# Patient Record
Sex: MALE | Race: WHITE | Employment: FULL TIME | URBAN - METROPOLITAN AREA
[De-identification: names, ages, dates, MRNs, and addresses within clinical notes are randomized per-mention and may not be internally consistent; named-entity substitution may affect disease eponyms.]

---

## 2017-09-19 ENCOUNTER — OFFICE VISIT (OUTPATIENT)
Dept: FAMILY MEDICINE CLINIC | Age: 42
End: 2017-09-19

## 2017-09-19 VITALS
RESPIRATION RATE: 16 BRPM | HEART RATE: 82 BPM | TEMPERATURE: 97 F | DIASTOLIC BLOOD PRESSURE: 70 MMHG | HEIGHT: 65 IN | WEIGHT: 260 LBS | BODY MASS INDEX: 43.32 KG/M2 | SYSTOLIC BLOOD PRESSURE: 124 MMHG | OXYGEN SATURATION: 98 %

## 2017-09-19 DIAGNOSIS — E78.6 LOW HDL (UNDER 40): ICD-10-CM

## 2017-09-19 DIAGNOSIS — R07.89 ATYPICAL CHEST PAIN: ICD-10-CM

## 2017-09-19 DIAGNOSIS — E66.01 MORBID OBESITY WITH BMI OF 40.0-44.9, ADULT (HCC): ICD-10-CM

## 2017-09-19 DIAGNOSIS — R06.83 SNORING: ICD-10-CM

## 2017-09-19 DIAGNOSIS — Z00.00 WELL ADULT EXAM: Primary | ICD-10-CM

## 2017-09-19 PROCEDURE — 93000 ELECTROCARDIOGRAM COMPLETE: CPT | Performed by: FAMILY MEDICINE

## 2017-09-19 PROCEDURE — 99396 PREV VISIT EST AGE 40-64: CPT | Performed by: FAMILY MEDICINE

## 2017-09-19 ASSESSMENT — ENCOUNTER SYMPTOMS
VOMITING: 0
EYE ITCHING: 0
TROUBLE SWALLOWING: 0
EYE REDNESS: 0
NAUSEA: 0
SHORTNESS OF BREATH: 0
ABDOMINAL PAIN: 0
WHEEZING: 0
CHEST TIGHTNESS: 0
RHINORRHEA: 0

## 2017-10-12 ENCOUNTER — TELEPHONE (OUTPATIENT)
Dept: SLEEP MEDICINE | Age: 42
End: 2017-10-12

## 2017-10-12 NOTE — TELEPHONE ENCOUNTER
Sandeep Ambrizer was referred to Sleep Medicine for Sleep Study. A message was left on the patient's voicemail to call the office back to schedule a consult.

## 2017-11-07 ENCOUNTER — OFFICE VISIT (OUTPATIENT)
Dept: ORTHOPEDIC SURGERY | Age: 42
End: 2017-11-07

## 2017-11-07 VITALS
DIASTOLIC BLOOD PRESSURE: 69 MMHG | SYSTOLIC BLOOD PRESSURE: 106 MMHG | WEIGHT: 272 LBS | RESPIRATION RATE: 16 BRPM | HEIGHT: 65 IN | BODY MASS INDEX: 45.32 KG/M2 | HEART RATE: 79 BPM

## 2017-11-07 DIAGNOSIS — M17.11 ARTHRITIS OF RIGHT KNEE: ICD-10-CM

## 2017-11-07 DIAGNOSIS — M25.561 ACUTE PAIN OF RIGHT KNEE: Primary | ICD-10-CM

## 2017-11-07 PROCEDURE — 99203 OFFICE O/P NEW LOW 30 MIN: CPT | Performed by: NURSE PRACTITIONER

## 2017-11-07 PROCEDURE — 73562 X-RAY EXAM OF KNEE 3: CPT | Performed by: NURSE PRACTITIONER

## 2017-11-07 PROCEDURE — 20610 DRAIN/INJ JOINT/BURSA W/O US: CPT | Performed by: NURSE PRACTITIONER

## 2017-11-07 NOTE — LETTER
[address] [facility] After Hours  [address]  [address] [facility],[address]  [address]  Phone: [phone]  Fax: [phone] Vish Maya CNP        November 10, 2017     Brook Barnhart, [address]    Patient: Zuly Loya  MR Number: [id number]  YOB: 1975  Date of Visit: 11/7/2017    Dear Dr. Brook Barnhart:    Thank you for the request for consultation for Zuly Loya to me for the evaluation of   Encounter Diagnoses   Name Primary?  Acute pain of right knee Yes    Arthritis of right knee      . Below are the relevant portions of my assessment and plan of care. [facility] Orthopedic Surgery  After Hours Clinic Note      CHIEF COMPLAINT:  Right medial knee pain    History Obtained From:  patient    HISTORY OF PRESENT ILLNESS:    The patient is a 38 y.o. male who presents with medial right knee pain that has been worsening over the past 3 years. Pain is described in right medial knee and with the intensity of moderate. Rates pain a 6-7/10 VAS and waxes and wanes. Pain is described as aching and intermittent. Pain is worse when driving and has no pain with rest or walking. When going to get up from driving, after walking a while the pain starts to subside. 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 He denies locking, clicking or instability. No numbness or tingling or weakness. He tried Aleve with no improvement. He only has one kidney and does not like to take many medication. He is going to walk in the [facility] Thanksgiving day parade and wants his knee pain gone prior to then.      Past Medical History:        Diagnosis Date    Cryptorchidism     R    Kidney agenesis     R     Obesity 8/22/2013    Seasonal allergies     Snoring        Past Surgical History:        Procedure Laterality Date    APPENDECTOMY      as child     HERNIA REPAIR 13 yo (femoral hernia?)     OTHER SURGICAL HISTORY      cryp repair        Social History   Substance Use Topics    Smoking status: Never Smoker    Smokeless tobacco: Never Used    Alcohol use No       Family History   Problem Relation Age of Onset    Cancer Maternal Grandmother      ovary    Other Maternal Aunt      renal failure    Other Maternal Grandfather      renal agenesis           Current Medications:   No current facility-administered medications for this visit. No current outpatient prescriptions on file. No current facility-administered medications for this visit. Allergies:  Food and Penicillins    REVIEW OF SYSTEMS:    CONSTITUTIONAL:  negative  MUSCULOSKELETAL:  positive for right medial knee pain  All other ROS reviewed in chart or with patient or family and are grossly negative. PHYSICAL EXAM:    VITALS:  /69   Pulse 79   Resp 16   Ht 5' 5\" (1.651 m)   Wt 272 lb (123.4 kg)   BMI 45.26 kg/m²      Mr. Lora is a very pleasant 38 y.o.  male who presents today in no acute distress, awake, alert, and oriented. He is well dressed, nourished and  groomed. Patient with normal affect. Height is  5' 5\" (1.651 m), weight is 272 lb (123.4 kg), Body mass index is 45.26 kg/m². Resting respiratory rate is 16. Skin warm and dry. Resp deep and easy. Pulse is with regular rate and rhythm        MUSCULOSKELETAL: He ambulates with nonaltalgic gait, no limp. KNEE EXAM:  Examination of the right knee shows: The alignment of the knee is neutral.   There is not erythema. There no soft tissue swelling. There is no effusion. ROM-  Extension full          -   Flexion  full   There no pain associated with ROM testing. Medial joint line is tender to palpation. Lateral joint line is not tender to palpation. Retro patellar crepitus is not present. There is is crepitus along the joint line with ROM testing.    Varus Stress testing does not produce pain, does not show laxity. Valgus Stress testing does not produce pain,                                       does not show laxity. Lachman's test- negative. Anterior Drawer test- negative. Posterior Drawer test- negative. Sundar's Test- positive. Patellar Compression testing does not produce pain. Extensor Mechanism is  intact. NEUROLOGIC:   Sensory:    Touch:                     Right Upper Extremity:  normal                   Left Upper Extremity:  normal                  Right Lower Extremity:  normal                  Left Lower Extremity:  normal        DATA:    CBC: No results found for: WBC, RBC, HGB, HCT, MCV, MCH, MCHC, RDW, PLT, MPV  WBC:  No results found for: WBC  PT/INR:  No results found for: PROTIME, INR  PTT:  No results found for: APTT[APTT      Radiology Review:  Xrays 3 views of the right knee taken today in [address] [place] [address] showed no acute fracture. Mild arthritis, worse prepatellar. IMPRESSION/RECOMMENDATIONS:  Right knee pain  Mild arthritis, probable meniscus tear    I discussed the findings with the patient. Due to his lack of one kidney, I would not recommend taking NSAIDs. He was instructed to work on quad strengthening which was demonstrated to him today in clinic. I discussed with him that he may benefit from Cortisone injection right knee and he agreed to proceed. PROCEDURE:    With the patient's permission, and under sterile condition, the right knee was prepared and draped with alcohol and injected with a mixture of 1 mL Kenalog 40mg and 4 mL of 1% lidocaine through the medial parapatellar port area. The patient tolerated the procedure well. A band-aid was applied and the patient was advised to ice the knee for 15-20 minutes to relieve any injection site related pain. He reported a good improvement immediately after the injection. Follow up with Dr Sanya Rainey.        Mason Rowan CNP  11/7/2017  8:37 PM If you have questions, please do not hesitate to call me. I look forward to following Tashi along with you.     Sincerely,        Radha Moses, CNP

## 2017-11-08 NOTE — PROGRESS NOTES
[facility] Orthopedic Surgery  After Hours Clinic Note      CHIEF COMPLAINT:  Right medial knee pain    History Obtained From:  patient    HISTORY OF PRESENT ILLNESS:    The patient is a 38 y.o. male who presents with medial right knee pain that has been worsening over the past 3 years. Pain is described in right medial knee and with the intensity of moderate. Rates pain a 6-7/10 VAS and waxes and wanes. Pain is described as aching and intermittent. Pain is worse when driving and has no pain with rest or walking. When going to get up from driving, after walking a while the pain starts to subside. 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 He denies locking, clicking or instability. No numbness or tingling or weakness. He tried Aleve with no improvement. He only has one kidney and does not like to take many medication. He is going to walk in the [facility] Thanksgiving day parade and wants his knee pain gone prior to then. Past Medical History:        Diagnosis Date    Cryptorchidism     R    Kidney agenesis     R     Obesity 8/22/2013    Seasonal allergies     Snoring        Past Surgical History:        Procedure Laterality Date    APPENDECTOMY      as child     HERNIA REPAIR      13 yo (femoral hernia?)     OTHER SURGICAL HISTORY      cryp repair        Social History   Substance Use Topics    Smoking status: Never Smoker    Smokeless tobacco: Never Used    Alcohol use No       Family History   Problem Relation Age of Onset    Cancer Maternal Grandmother      ovary    Other Maternal Aunt      renal failure    Other Maternal Grandfather      renal agenesis           Current Medications:   No current facility-administered medications for this visit. No current outpatient prescriptions on file. No current facility-administered medications for this visit.         Allergies:  Food and Penicillins    REVIEW OF SYSTEMS:

## 2017-11-10 NOTE — COMMUNICATION BODY
[facility] Orthopedic Surgery  After Hours Clinic Note      CHIEF COMPLAINT:  Right medial knee pain    History Obtained From:  patient    HISTORY OF PRESENT ILLNESS:    The patient is a 38 y.o. male who presents with medial right knee pain that has been worsening over the past 3 years. Pain is described in right medial knee and with the intensity of moderate. Rates pain a 6-7/10 VAS and waxes and wanes. Pain is described as aching and intermittent. Pain is worse when driving and has no pain with rest or walking. When going to get up from driving, after walking a while the pain starts to subside. 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 He denies locking, clicking or instability. No numbness or tingling or weakness. He tried Aleve with no improvement. He only has one kidney and does not like to take many medication. He is going to walk in the [facility] Thanksgiving day parade and wants his knee pain gone prior to then. Past Medical History:        Diagnosis Date    Cryptorchidism     R    Kidney agenesis     R     Obesity 8/22/2013    Seasonal allergies     Snoring        Past Surgical History:        Procedure Laterality Date    APPENDECTOMY      as child     HERNIA REPAIR      13 yo (femoral hernia?)     OTHER SURGICAL HISTORY      cryp repair        Social History   Substance Use Topics    Smoking status: Never Smoker    Smokeless tobacco: Never Used    Alcohol use No       Family History   Problem Relation Age of Onset    Cancer Maternal Grandmother      ovary    Other Maternal Aunt      renal failure    Other Maternal Grandfather      renal agenesis           Current Medications:   No current facility-administered medications for this visit. No current outpatient prescriptions on file. No current facility-administered medications for this visit.         Allergies:  Food and Penicillins    REVIEW OF SYSTEMS:

## 2021-07-01 ENCOUNTER — OFFICE VISIT (OUTPATIENT)
Dept: FAMILY MEDICINE CLINIC | Age: 46
End: 2021-07-01
Payer: COMMERCIAL

## 2021-07-01 VITALS
BODY MASS INDEX: 46.88 KG/M2 | HEIGHT: 65 IN | TEMPERATURE: 97.9 F | RESPIRATION RATE: 16 BRPM | WEIGHT: 281.4 LBS | DIASTOLIC BLOOD PRESSURE: 80 MMHG | OXYGEN SATURATION: 97 % | SYSTOLIC BLOOD PRESSURE: 124 MMHG | HEART RATE: 87 BPM

## 2021-07-01 DIAGNOSIS — Z00.00 WELL ADULT EXAM: Primary | ICD-10-CM

## 2021-07-01 LAB
A/G RATIO: 1.4 (ref 1.1–2.2)
ALBUMIN SERPL-MCNC: 4.2 G/DL (ref 3.4–5)
ALP BLD-CCNC: 94 U/L (ref 40–129)
ALT SERPL-CCNC: 21 U/L (ref 10–40)
ANION GAP SERPL CALCULATED.3IONS-SCNC: 12 MMOL/L (ref 3–16)
AST SERPL-CCNC: 17 U/L (ref 15–37)
BILIRUB SERPL-MCNC: 0.7 MG/DL (ref 0–1)
BUN BLDV-MCNC: 14 MG/DL (ref 7–20)
CALCIUM SERPL-MCNC: 9.3 MG/DL (ref 8.3–10.6)
CHLORIDE BLD-SCNC: 101 MMOL/L (ref 99–110)
CHOLESTEROL, TOTAL: 176 MG/DL (ref 0–199)
CO2: 27 MMOL/L (ref 21–32)
CREAT SERPL-MCNC: 0.9 MG/DL (ref 0.9–1.3)
GFR AFRICAN AMERICAN: >60
GFR NON-AFRICAN AMERICAN: >60
GLOBULIN: 2.9 G/DL
GLUCOSE BLD-MCNC: 118 MG/DL (ref 70–99)
HDLC SERPL-MCNC: 40 MG/DL (ref 40–60)
LDL CHOLESTEROL CALCULATED: 118 MG/DL
POTASSIUM SERPL-SCNC: 4.3 MMOL/L (ref 3.5–5.1)
SODIUM BLD-SCNC: 140 MMOL/L (ref 136–145)
TOTAL PROTEIN: 7.1 G/DL (ref 6.4–8.2)
TRIGL SERPL-MCNC: 92 MG/DL (ref 0–150)
VLDLC SERPL CALC-MCNC: 18 MG/DL

## 2021-07-01 PROCEDURE — 36415 COLL VENOUS BLD VENIPUNCTURE: CPT | Performed by: FAMILY MEDICINE

## 2021-07-01 PROCEDURE — 99386 PREV VISIT NEW AGE 40-64: CPT | Performed by: FAMILY MEDICINE

## 2021-07-01 ASSESSMENT — ENCOUNTER SYMPTOMS
SHORTNESS OF BREATH: 0
WHEEZING: 0
EYE ITCHING: 0
TROUBLE SWALLOWING: 0
NAUSEA: 0
EYE REDNESS: 0
CHEST TIGHTNESS: 0
ABDOMINAL PAIN: 0
VOMITING: 0
RHINORRHEA: 0

## 2021-07-01 ASSESSMENT — PATIENT HEALTH QUESTIONNAIRE - PHQ9
SUM OF ALL RESPONSES TO PHQ QUESTIONS 1-9: 0
1. LITTLE INTEREST OR PLEASURE IN DOING THINGS: 0
SUM OF ALL RESPONSES TO PHQ QUESTIONS 1-9: 0
SUM OF ALL RESPONSES TO PHQ9 QUESTIONS 1 & 2: 0
2. FEELING DOWN, DEPRESSED OR HOPELESS: 0
SUM OF ALL RESPONSES TO PHQ QUESTIONS 1-9: 0

## 2021-07-01 NOTE — PROGRESS NOTES
Subjective:      Patient ID: Justin Vazquez is a 50 y.o. male. HPI    Here to become re established    Well Adult Physical   Patient here for a comprehensive physical exam.The patient reports problems - none   Do you take any herbs or supplements that were not prescribed by a doctor? no Are you taking calcium supplements? not applicable Are you taking aspirin daily? no   History:  Any STD's in the past? none  tdap utd  covid vaccine utd  Exercise: twice / week   Eye exam utd     Review of Systems   Constitutional: Negative for activity change, appetite change, fatigue, fever and unexpected weight change. HENT: Negative for congestion, postnasal drip, rhinorrhea and trouble swallowing. Eyes: Negative for redness and itching. Respiratory: Negative for chest tightness, shortness of breath and wheezing. Cardiovascular: Negative for chest pain. Gastrointestinal: Negative for abdominal pain, nausea and vomiting. Endocrine: Negative for polydipsia, polyphagia and polyuria. Genitourinary: Negative for discharge, dysuria, testicular pain and urgency. Musculoskeletal: Negative for arthralgias, joint swelling, myalgias and neck pain. Skin: Negative for rash. Neurological: Negative for dizziness, light-headedness and headaches. Hematological: Negative for adenopathy. Psychiatric/Behavioral: Negative for behavioral problems. The patient is not nervous/anxious. is allergic to food and penicillins. No current outpatient medications on file. has a past medical history of COVID-19, Cryptorchidism, Kidney agenesis, Obesity, Seasonal allergies, and Snoring. Past Surgical History:   Procedure Laterality Date    APPENDECTOMY      as child     HERNIA REPAIR      13 yo (femoral hernia?)     OTHER SURGICAL HISTORY      cryp repair         reports that he has never smoked. He has never used smokeless tobacco. He reports that he does not drink alcohol and does not use drugs.     family history includes Cancer in his maternal grandmother; Other in his maternal aunt and maternal grandfather. Objective:  Blood pressure 124/80, pulse 87, temperature 97.9 °F (36.6 °C), temperature source Tympanic, resp. rate 16, height 5' 5\" (1.651 m), weight 281 lb 6.4 oz (127.6 kg), SpO2 97 %. Physical Exam  Vitals and nursing note reviewed. Constitutional:       General: He is not in acute distress. Appearance: Normal appearance. He is well-developed. He is obese. He is not ill-appearing, toxic-appearing or diaphoretic. HENT:      Head: Normocephalic. Right Ear: Tympanic membrane, ear canal and external ear normal. There is no impacted cerumen. Left Ear: Tympanic membrane, ear canal and external ear normal. There is no impacted cerumen. Nose: Nose normal. No congestion. Mouth/Throat:      Mouth: Mucous membranes are moist.      Pharynx: Oropharynx is clear. No oropharyngeal exudate or posterior oropharyngeal erythema. Eyes:      General: No scleral icterus. Right eye: No discharge. Left eye: No discharge. Extraocular Movements: Extraocular movements intact. Conjunctiva/sclera: Conjunctivae normal.      Pupils: Pupils are equal, round, and reactive to light. Neck:      Thyroid: No thyromegaly. Vascular: No carotid bruit or JVD. Cardiovascular:      Rate and Rhythm: Normal rate and regular rhythm. Pulses: Normal pulses. Heart sounds: Normal heart sounds. No murmur heard. No friction rub. No gallop. Pulmonary:      Effort: Pulmonary effort is normal. No respiratory distress. Breath sounds: Normal breath sounds. No stridor. No wheezing, rhonchi or rales. Chest:      Chest wall: No tenderness. Abdominal:      General: Abdomen is flat. Bowel sounds are normal. There is no distension. Palpations: Abdomen is soft. There is no mass. Tenderness: There is no abdominal tenderness. There is no guarding.       Hernia: No hernia is

## 2021-07-02 LAB
ESTIMATED AVERAGE GLUCOSE: 142.7 MG/DL
HBA1C MFR BLD: 6.6 %

## 2021-07-13 ENCOUNTER — TELEMEDICINE (OUTPATIENT)
Dept: FAMILY MEDICINE CLINIC | Age: 46
End: 2021-07-13
Payer: COMMERCIAL

## 2021-07-13 DIAGNOSIS — E78.5 DYSLIPIDEMIA (HIGH LDL; LOW HDL): ICD-10-CM

## 2021-07-13 DIAGNOSIS — E11.9 TYPE 2 DIABETES MELLITUS WITHOUT COMPLICATION, WITHOUT LONG-TERM CURRENT USE OF INSULIN (HCC): Primary | ICD-10-CM

## 2021-07-13 PROCEDURE — 99442 PR PHYS/QHP TELEPHONE EVALUATION 11-20 MIN: CPT | Performed by: FAMILY MEDICINE

## 2021-07-13 RX ORDER — METFORMIN HYDROCHLORIDE 500 MG/1
500 TABLET, EXTENDED RELEASE ORAL
Qty: 30 TABLET | Refills: 3 | Status: SHIPPED | OUTPATIENT
Start: 2021-07-13

## 2021-07-13 ASSESSMENT — PATIENT HEALTH QUESTIONNAIRE - PHQ9
SUM OF ALL RESPONSES TO PHQ QUESTIONS 1-9: 0
SUM OF ALL RESPONSES TO PHQ QUESTIONS 1-9: 0
1. LITTLE INTEREST OR PLEASURE IN DOING THINGS: 0
2. FEELING DOWN, DEPRESSED OR HOPELESS: 0
SUM OF ALL RESPONSES TO PHQ9 QUESTIONS 1 & 2: 0
SUM OF ALL RESPONSES TO PHQ QUESTIONS 1-9: 0

## 2021-07-13 ASSESSMENT — ENCOUNTER SYMPTOMS
BLURRED VISION: 0
VISUAL CHANGE: 0

## 2021-07-13 NOTE — PROGRESS NOTES
Subjective:      Dianne Keyes is a 50 y.o. male evaluated via telephone on 7/13/2021. Consent:  He and/or health care decision maker is aware that that he may receive a bill for this telephone service, depending on his insurance coverage, and has provided verbal consent to proceed: Yes      Documentation:  I communicated with the patient and/or health care decision maker about cc. Details of this discussion including any medical advice provided: yes       I affirm this is a Patient Initiated Episode with a Patient who has not had a related appointment within my department in the past 7 days or scheduled within the next 24 hours. Patient identification was verified at the start of the visit: Yes    Total Time: minutes: 12 minutes    The visit was conducted pursuant to the emergency declaration under the [address] [place] [address], [address] [place] [address] authority and the [employer] Act. Patient identification was verified, and a caregiver was present when appropriate. The patient was located in a state where the provider was credentialed to provide care. Note: not billable if this call serves to triage the patient into an appointment for the relevant concern      Pricilla Servin MD       Patient ID: Dianne Keyes is a 50 y.o. male. diabe    Diabetes  He presents for his initial diabetic visit. He has type 2 diabetes mellitus. Pertinent negatives for diabetes include no blurred vision, no chest pain, no fatigue, no foot paresthesias, no foot ulcerations, no polydipsia, no polyphagia, no polyuria, no visual change, no weakness and no weight loss. He is following a generally unhealthy diet. When asked about meal planning, he reported none. Review of Systems   Constitutional: Negative for fatigue and weight loss. Eyes: Negative for blurred vision. Cardiovascular: Negative for chest pain.    Endocrine: Negative for polydipsia, polyphagia and polyuria. Neurological: Negative for weakness. Objective:   Physical Exam  Neurological:      Mental Status: He is alert and oriented to person, place, and time. Psychiatric:         Mood and Affect: Mood normal.         Thought Content: Thought content normal.         Assessment:       Diagnosis Orders   1. Type 2 diabetes mellitus without complication, without long-term current use of insulin (HCC)  metFORMIN (GLUCOPHAGE XR) 500 MG extended release tablet   2.  Dyslipidemia (high LDL; low HDL)             Plan:      New dx   Start metformin as discussed during this visit  Sec effects including GI sx like bloating, fullness, diarrhea, indigestion discussed  encourage him also to exercise if possible 30 min / day   patient agrees and verbalizes understanding    Recheck labs in 3 mo            Speedy Shook MD

## 2023-06-15 ENCOUNTER — APPOINTMENT (OUTPATIENT)
Dept: GENERAL RADIOLOGY | Age: 48
End: 2023-06-15
Payer: OTHER MISCELLANEOUS

## 2023-06-15 ENCOUNTER — HOSPITAL ENCOUNTER (EMERGENCY)
Age: 48
Discharge: HOME | End: 2023-06-15
Attending: EMERGENCY MEDICINE
Payer: OTHER MISCELLANEOUS

## 2023-06-15 VITALS
OXYGEN SATURATION: 96 % | RESPIRATION RATE: 16 BRPM | TEMPERATURE: 98.2 F | SYSTOLIC BLOOD PRESSURE: 95 MMHG | WEIGHT: 284.61 LBS | DIASTOLIC BLOOD PRESSURE: 66 MMHG | HEART RATE: 81 BPM | HEIGHT: 65 IN | BODY MASS INDEX: 47.42 KG/M2

## 2023-06-15 DIAGNOSIS — S40.021A CONTUSION OF MULTIPLE SITES OF RIGHT SHOULDER AND UPPER ARM, INITIAL ENCOUNTER: Primary | ICD-10-CM

## 2023-06-15 DIAGNOSIS — S40.011A CONTUSION OF MULTIPLE SITES OF RIGHT SHOULDER AND UPPER ARM, INITIAL ENCOUNTER: Primary | ICD-10-CM

## 2023-06-15 PROCEDURE — 99283 EMERGENCY DEPT VISIT LOW MDM: CPT

## 2023-06-15 PROCEDURE — 73030 X-RAY EXAM OF SHOULDER: CPT

## 2023-06-15 ASSESSMENT — PAIN - FUNCTIONAL ASSESSMENT
PAIN_FUNCTIONAL_ASSESSMENT: 0-10
PAIN_FUNCTIONAL_ASSESSMENT: 0-10

## 2023-06-15 ASSESSMENT — ENCOUNTER SYMPTOMS
ABDOMINAL PAIN: 0
BACK PAIN: 0
COLOR CHANGE: 0
COUGH: 0
SHORTNESS OF BREATH: 0
ABDOMINAL DISTENTION: 0
SORE THROAT: 0

## 2023-06-15 ASSESSMENT — PAIN DESCRIPTION - DESCRIPTORS
DESCRIPTORS: ACHING
DESCRIPTORS: ACHING

## 2023-06-15 ASSESSMENT — PAIN SCALES - GENERAL
PAINLEVEL_OUTOF10: 7
PAINLEVEL_OUTOF10: 2

## 2023-06-15 ASSESSMENT — PAIN DESCRIPTION - PAIN TYPE
TYPE: ACUTE PAIN
TYPE: ACUTE PAIN

## 2023-06-15 ASSESSMENT — PAIN DESCRIPTION - LOCATION
LOCATION: SHOULDER
LOCATION: SHOULDER

## 2023-06-15 ASSESSMENT — PAIN DESCRIPTION - ORIENTATION
ORIENTATION: RIGHT
ORIENTATION: RIGHT

## 2023-06-15 ASSESSMENT — PAIN DESCRIPTION - FREQUENCY
FREQUENCY: CONTINUOUS
FREQUENCY: CONTINUOUS
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
## 2023-06-15 NOTE — ED NOTES
Discharge and education instructions reviewed with patient. Teach-back successful. Pt verbalized understanding and signed d/c papers. Pt denied questions at this time. No acute distress noted. Patient instructed to follow-up as noted - return to emergency department if symptoms worsen. Patient verbalized understanding. Discharged per EDMD with discharge instructions. Pt discharged to private vehicle. Patient stable upon departure. Thanked patient for choosing [facility]) for care.          Deidra High RN  06/15/23 1029

## 2023-06-15 NOTE — DISCHARGE INSTRUCTIONS
Ice.  Limited use. But keep moving at some. Ibuprofen as needed for pain. Follow-up with your primary care doctor likely better in 1 to 2 weeks sooner if worse or problems.   Return immediately if any concerns